# Patient Record
Sex: MALE | Race: BLACK OR AFRICAN AMERICAN | Employment: PART TIME | URBAN - METROPOLITAN AREA
[De-identification: names, ages, dates, MRNs, and addresses within clinical notes are randomized per-mention and may not be internally consistent; named-entity substitution may affect disease eponyms.]

---

## 2017-02-10 PROBLEM — T45.1X5A CHEMOTHERAPY INDUCED NEUTROPENIA (HCC): Status: ACTIVE | Noted: 2017-02-10

## 2017-02-10 PROBLEM — D70.1 CHEMOTHERAPY INDUCED NEUTROPENIA (HCC): Status: ACTIVE | Noted: 2017-02-10

## 2017-05-30 ENCOUNTER — HOSPITAL ENCOUNTER (OUTPATIENT)
Dept: ONCOLOGY | Age: 57
Discharge: HOME | End: 2017-05-31
Attending: INTERNAL MEDICINE | Admitting: INTERNAL MEDICINE

## 2017-05-30 ENCOUNTER — HOSPITAL ENCOUNTER (OUTPATIENT)
Dept: PULMONOLOGY | Age: 57
Discharge: HOME | End: 2017-05-30
Attending: INTERNAL MEDICINE | Admitting: INTERNAL MEDICINE

## 2017-05-30 VITALS
RESPIRATION RATE: 18 BRPM | BODY MASS INDEX: 22.45 KG/M2 | WEIGHT: 148.15 LBS | TEMPERATURE: 98.3 F | HEIGHT: 68 IN | SYSTOLIC BLOOD PRESSURE: 143 MMHG | DIASTOLIC BLOOD PRESSURE: 84 MMHG | HEART RATE: 82 BPM

## 2017-05-30 DIAGNOSIS — C90.30 SOLITARY PLASMACYTOMA NOT HAVING ACHIEVED REMISSION (HCC): ICD-10-CM

## 2017-05-30 LAB
A/G RATIO: 2 (ref 1.1–2.2)
ABO/RH: NORMAL
ALBUMIN SERPL-MCNC: 4 G/DL (ref 3.4–5)
ALP BLD-CCNC: 106 U/L (ref 40–129)
ALT SERPL-CCNC: 14 U/L (ref 10–40)
ANION GAP SERPL CALCULATED.3IONS-SCNC: 14 MMOL/L (ref 3–16)
ANTIBODY SCREEN: NORMAL
APTT: 42.1 SEC (ref 21–31.8)
AST SERPL-CCNC: 20 U/L (ref 15–37)
BASOPHILS ABSOLUTE: 0 K/UL (ref 0–0.2)
BASOPHILS RELATIVE PERCENT: 0.7 %
BILIRUB SERPL-MCNC: <0.2 MG/DL (ref 0–1)
BILIRUBIN DIRECT: <0.2 MG/DL (ref 0–0.3)
BILIRUBIN, INDIRECT: NORMAL MG/DL (ref 0–1)
BUN BLDV-MCNC: 28 MG/DL (ref 7–20)
CALCIUM SERPL-MCNC: 9.2 MG/DL (ref 8.3–10.6)
CHLORIDE BLD-SCNC: 96 MMOL/L (ref 99–110)
CO2: 33 MMOL/L (ref 21–32)
CREAT SERPL-MCNC: 6.9 MG/DL (ref 0.9–1.3)
EKG ATRIAL RATE: 78 BPM
EKG DIAGNOSIS: NORMAL
EKG P AXIS: 44 DEGREES
EKG P-R INTERVAL: 142 MS
EKG Q-T INTERVAL: 430 MS
EKG QRS DURATION: 84 MS
EKG QTC CALCULATION (BAZETT): 490 MS
EKG R AXIS: 26 DEGREES
EKG T AXIS: 189 DEGREES
EKG VENTRICULAR RATE: 78 BPM
EOSINOPHILS ABSOLUTE: 0 K/UL (ref 0–0.6)
EOSINOPHILS RELATIVE PERCENT: 0.6 %
GFR AFRICAN AMERICAN: 10
GFR NON-AFRICAN AMERICAN: 8
GLOBULIN: 2 G/DL
GLUCOSE BLD-MCNC: 102 MG/DL (ref 70–99)
HAV IGM SER IA-ACNC: NORMAL
HBV SURFACE AB TITR SER: <3.5 MUL/ML
HCT VFR BLD CALC: 29.3 % (ref 40.5–52.5)
HEMOGLOBIN: 9.4 G/DL (ref 13.5–17.5)
HEPATITIS B SURFACE ANTIGEN INTERPRETATION: NORMAL
HEPATITIS C ANTIBODY INTERPRETATION: NORMAL
INR BLD: 1 (ref 0.85–1.15)
LACTATE DEHYDROGENASE: 241 U/L (ref 100–190)
LV EF: 50 %
LVEF MODALITY: NORMAL
LYMPHOCYTES ABSOLUTE: 0.4 K/UL (ref 1–5.1)
LYMPHOCYTES RELATIVE PERCENT: 10.5 %
MCH RBC QN AUTO: 29 PG (ref 26–34)
MCHC RBC AUTO-ENTMCNC: 32.1 G/DL (ref 31–36)
MCV RBC AUTO: 90.3 FL (ref 80–100)
MONOCYTES ABSOLUTE: 0.8 K/UL (ref 0–1.3)
MONOCYTES RELATIVE PERCENT: 18.2 %
NEUTROPHILS ABSOLUTE: 3 K/UL (ref 1.7–7.7)
NEUTROPHILS RELATIVE PERCENT: 70 %
PDW BLD-RTO: 19.5 % (ref 12.4–15.4)
PHOSPHORUS: 4.6 MG/DL (ref 2.5–4.9)
PLATELET # BLD: 115 K/UL (ref 135–450)
PMV BLD AUTO: 8.8 FL (ref 5–10.5)
POTASSIUM SERPL-SCNC: 3.7 MMOL/L (ref 3.5–5.1)
PROTHROMBIN TIME: 11.3 SEC (ref 9.6–13)
RBC # BLD: 3.24 M/UL (ref 4.2–5.9)
SODIUM BLD-SCNC: 143 MMOL/L (ref 136–145)
TOTAL PROTEIN: 6 G/DL (ref 6.4–8.2)
URIC ACID, SERUM: 3.2 MG/DL (ref 3.5–7.2)
WBC # BLD: 4.2 K/UL (ref 4–11)

## 2017-05-30 PROCEDURE — 93010 ELECTROCARDIOGRAM REPORT: CPT | Performed by: INTERNAL MEDICINE

## 2017-05-30 RX ORDER — HEPARIN SODIUM (PORCINE) LOCK FLUSH IV SOLN 100 UNIT/ML 100 UNIT/ML
100 SOLUTION INTRAVENOUS PRN
Status: DISCONTINUED | OUTPATIENT
Start: 2017-05-30 | End: 2017-06-01 | Stop reason: HOSPADM

## 2017-05-31 LAB — RPR: NORMAL

## 2017-06-01 LAB
CYTOMEGALOVIRUS IGG ANTIBODY: >10 U/ML
CYTOMEGALOVIRUS IGM ANTIBODY: <8 AU/ML
HEPATITIS B CORE TOTAL ANTIBODY: NEGATIVE
HEPATITIS BE ANTIBODY: NEGATIVE
HERPES TYPE 1/2 IGM COMBINED: 0.13 IV
VZV IGG SER QL IA: 193 IV
WEST NILE VIRUS, IGG: 0.01 IV
WEST NILE VIRUS, IGM: 0.03 IV

## 2017-06-02 LAB
EER HCV RNA QNT PCR: NORMAL
HCV RNA QNT REAL-TIME PCR INTERP: NOT DETECTED
HCV RNA, QUANTITATIVE REAL TIME PCR: <1.2 LOG IU
HEPATITIS C RNA PCR QUANT: <15 IU/ML
HERPES TYPE I/II IGG COMBINED: >22.4 IV
HIV-1 AND HIV-2 ANTIBODIES: NEGATIVE
HIV-1 DNA, QUAL, PCR: NOT DETECTED
HTLV I/II AB: NEGATIVE
VARICELLA ZOSTER AB IGM: 0 ISR

## 2017-06-03 LAB — TRYPANOSOMA CRUZI IGG ANTIBODY: 0.28 IV

## 2017-06-04 LAB — TRYPANOSOMA CRUZI IGM ANTIBODY: NORMAL

## 2017-06-05 ENCOUNTER — TELEPHONE (OUTPATIENT)
Dept: PULMONOLOGY | Age: 57
End: 2017-06-05

## 2017-06-06 LAB — SICKLE CELL SCREEN: NEGATIVE

## 2017-06-09 ENCOUNTER — OFFICE VISIT (OUTPATIENT)
Dept: CARDIOLOGY CLINIC | Age: 57
End: 2017-06-09

## 2017-06-09 VITALS
DIASTOLIC BLOOD PRESSURE: 90 MMHG | BODY MASS INDEX: 23.04 KG/M2 | SYSTOLIC BLOOD PRESSURE: 150 MMHG | HEART RATE: 68 BPM | WEIGHT: 152 LBS

## 2017-06-09 DIAGNOSIS — R94.31 ABNORMAL EKG: ICD-10-CM

## 2017-06-09 DIAGNOSIS — I34.0 NON-RHEUMATIC MITRAL REGURGITATION: ICD-10-CM

## 2017-06-09 DIAGNOSIS — Z01.818 PRE-OP EVALUATION: Primary | ICD-10-CM

## 2017-06-09 DIAGNOSIS — I10 ESSENTIAL HYPERTENSION: ICD-10-CM

## 2017-06-09 PROCEDURE — 1111F DSCHRG MED/CURRENT MED MERGE: CPT | Performed by: INTERNAL MEDICINE

## 2017-06-09 PROCEDURE — G8420 CALC BMI NORM PARAMETERS: HCPCS | Performed by: INTERNAL MEDICINE

## 2017-06-09 PROCEDURE — 3017F COLORECTAL CA SCREEN DOC REV: CPT | Performed by: INTERNAL MEDICINE

## 2017-06-09 PROCEDURE — G8428 CUR MEDS NOT DOCUMENT: HCPCS | Performed by: INTERNAL MEDICINE

## 2017-06-09 PROCEDURE — 1036F TOBACCO NON-USER: CPT | Performed by: INTERNAL MEDICINE

## 2017-06-09 PROCEDURE — 99204 OFFICE O/P NEW MOD 45 MIN: CPT | Performed by: INTERNAL MEDICINE

## 2017-06-09 ASSESSMENT — ENCOUNTER SYMPTOMS
CHEST TIGHTNESS: 0
SHORTNESS OF BREATH: 0
APNEA: 0
COUGH: 0
ABDOMINAL DISTENTION: 0
CHOKING: 0

## 2017-06-14 ENCOUNTER — TELEPHONE (OUTPATIENT)
Dept: PULMONOLOGY | Age: 57
End: 2017-06-14

## 2017-06-14 ENCOUNTER — OFFICE VISIT (OUTPATIENT)
Dept: PULMONOLOGY | Age: 57
End: 2017-06-14

## 2017-06-14 VITALS
SYSTOLIC BLOOD PRESSURE: 140 MMHG | HEART RATE: 81 BPM | DIASTOLIC BLOOD PRESSURE: 86 MMHG | HEIGHT: 68 IN | BODY MASS INDEX: 22.88 KG/M2 | OXYGEN SATURATION: 97 % | WEIGHT: 151 LBS | RESPIRATION RATE: 16 BRPM

## 2017-06-14 DIAGNOSIS — J98.4 RESTRICTIVE LUNG DISEASE: Primary | ICD-10-CM

## 2017-06-14 PROCEDURE — G8420 CALC BMI NORM PARAMETERS: HCPCS | Performed by: INTERNAL MEDICINE

## 2017-06-14 PROCEDURE — 1036F TOBACCO NON-USER: CPT | Performed by: INTERNAL MEDICINE

## 2017-06-14 PROCEDURE — 3017F COLORECTAL CA SCREEN DOC REV: CPT | Performed by: INTERNAL MEDICINE

## 2017-06-14 PROCEDURE — G8427 DOCREV CUR MEDS BY ELIG CLIN: HCPCS | Performed by: INTERNAL MEDICINE

## 2017-06-14 PROCEDURE — 99214 OFFICE O/P EST MOD 30 MIN: CPT | Performed by: INTERNAL MEDICINE

## 2017-06-29 PROBLEM — Z94.81 AUTOLOGOUS BONE MARROW TRANSPLANTATION STATUS (HCC): Status: ACTIVE | Noted: 2017-06-29

## 2018-04-19 PROBLEM — J18.9 PNEUMONIA: Status: ACTIVE | Noted: 2018-04-19

## 2019-03-14 ENCOUNTER — HOSPITAL ENCOUNTER (OUTPATIENT)
Dept: ONCOLOGY | Age: 59
Setting detail: INFUSION SERIES
Discharge: HOME | End: 2019-03-14
Payer: MEDICARE

## 2019-03-14 ENCOUNTER — HOSPITAL ENCOUNTER (OUTPATIENT)
Dept: GENERAL RADIOLOGY | Age: 59
Discharge: HOME | End: 2019-03-14
Payer: MEDICARE

## 2019-03-14 VITALS
SYSTOLIC BLOOD PRESSURE: 139 MMHG | DIASTOLIC BLOOD PRESSURE: 69 MMHG | OXYGEN SATURATION: 99 % | HEART RATE: 81 BPM | RESPIRATION RATE: 18 BRPM | TEMPERATURE: 97.9 F

## 2019-03-14 PROBLEM — J11.1 INFLUENZA: Status: ACTIVE | Noted: 2019-03-14

## 2019-03-14 LAB
A/G RATIO: 1.4 (ref 1.1–2.2)
ALBUMIN SERPL-MCNC: 4.3 G/DL (ref 3.4–5)
ALP BLD-CCNC: 60 U/L (ref 40–129)
ALT SERPL-CCNC: 11 U/L (ref 10–40)
ANION GAP SERPL CALCULATED.3IONS-SCNC: 14 MMOL/L (ref 3–16)
ANISOCYTOSIS: ABNORMAL
AST SERPL-CCNC: 19 U/L (ref 15–37)
BASOPHILS ABSOLUTE: 0 K/UL (ref 0–0.2)
BASOPHILS RELATIVE PERCENT: 0.4 %
BILIRUB SERPL-MCNC: <0.2 MG/DL (ref 0–1)
BUN BLDV-MCNC: 21 MG/DL (ref 7–20)
CALCIUM SERPL-MCNC: 9.2 MG/DL (ref 8.3–10.6)
CHLORIDE BLD-SCNC: 97 MMOL/L (ref 99–110)
CO2: 30 MMOL/L (ref 21–32)
CREAT SERPL-MCNC: 7.1 MG/DL (ref 0.9–1.3)
EOSINOPHILS ABSOLUTE: 0 K/UL (ref 0–0.6)
EOSINOPHILS RELATIVE PERCENT: 0 %
GFR AFRICAN AMERICAN: 10
GFR NON-AFRICAN AMERICAN: 8
GLOBULIN: 3.1 G/DL
GLUCOSE BLD-MCNC: 129 MG/DL (ref 70–99)
HCT VFR BLD CALC: 38.2 % (ref 40.5–52.5)
HEMOGLOBIN: 12.3 G/DL (ref 13.5–17.5)
LYMPHOCYTES ABSOLUTE: 0.3 K/UL (ref 1–5.1)
LYMPHOCYTES RELATIVE PERCENT: 9.8 %
MCH RBC QN AUTO: 29 PG (ref 26–34)
MCHC RBC AUTO-ENTMCNC: 32.1 G/DL (ref 31–36)
MCV RBC AUTO: 90.4 FL (ref 80–100)
MONOCYTES ABSOLUTE: 0.5 K/UL (ref 0–1.3)
MONOCYTES RELATIVE PERCENT: 17.3 %
NEUTROPHILS ABSOLUTE: 2.1 K/UL (ref 1.7–7.7)
NEUTROPHILS RELATIVE PERCENT: 72.5 %
PDW BLD-RTO: 17.4 % (ref 12.4–15.4)
PLATELET # BLD: 95 K/UL (ref 135–450)
PLATELET SLIDE REVIEW: ABNORMAL
PMV BLD AUTO: 7.9 FL (ref 5–10.5)
POTASSIUM SERPL-SCNC: 5 MMOL/L (ref 3.5–5.1)
RAPID INFLUENZA  B AGN: NEGATIVE
RAPID INFLUENZA A AGN: POSITIVE
RBC # BLD: 4.23 M/UL (ref 4.2–5.9)
SODIUM BLD-SCNC: 141 MMOL/L (ref 136–145)
TOTAL PROTEIN: 7.4 G/DL (ref 6.4–8.2)
WBC # BLD: 2.9 K/UL (ref 4–11)

## 2019-03-14 PROCEDURE — 99213 OFFICE O/P EST LOW 20 MIN: CPT

## 2019-03-14 PROCEDURE — 87804 INFLUENZA ASSAY W/OPTIC: CPT

## 2019-03-14 PROCEDURE — 89220 SPUTUM SPECIMEN COLLECTION: CPT

## 2019-03-14 PROCEDURE — 71045 X-RAY EXAM CHEST 1 VIEW: CPT

## 2019-03-14 PROCEDURE — 85025 COMPLETE CBC W/AUTO DIFF WBC: CPT

## 2019-03-14 PROCEDURE — 80053 COMPREHEN METABOLIC PANEL: CPT

## 2019-03-14 PROCEDURE — 6370000000 HC RX 637 (ALT 250 FOR IP): Performed by: INTERNAL MEDICINE

## 2019-03-14 RX ORDER — ACETAMINOPHEN 500 MG
500 TABLET ORAL PRN
Status: DISCONTINUED | OUTPATIENT
Start: 2019-03-14 | End: 2019-03-15 | Stop reason: HOSPADM

## 2019-03-14 RX ADMIN — ACETAMINOPHEN 500 MG: 500 TABLET, COATED ORAL at 14:00

## 2019-03-14 ASSESSMENT — PAIN SCALES - GENERAL: PAINLEVEL_OUTOF10: 0

## 2019-03-14 NOTE — PROGRESS NOTES
03/14/19    ONCOLOGY FOLLOW-UP:       Primary Oncologist: Geovanni Mistry    Referring Physician: Lincoln Mchugh DO       PROBLEM LIST:       1. Kappa light chain multiple myeloma, Dx early April 2014 - Calcium 12.2, creatinine 5.91, albumin 4.7. Beta-2 microglobulin 21.5. Hemoglobin 7.6. Kappa free light chains 2970. Multiple osteolytic lesions and also a soft tissue mass in the SC joint, 5.9 x 4.3 cm. 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 Bone marrow aspiration and biopsy hypercellular for age, 75% to 80%, nearly exclusively CD38,  plasma cells cytoplasmically restricted to kappa light chain. Conventional karyotyping normal. FISH positive for t(11;14). 2. ESRD - Tuesdays, Thursdays, and Saturdays. 3. Catheter-related infection, culture negative      TREATMENT:      1. Induction therapy: bortezomib and dexamethasone due to renal failure. Hypercalcemia addressed with aggressive hydration and bisphosphonate; initial improvement in renal function with creatinine down to 3.8. 2. RVd, lenalidomide dose attenuated to 5 mg daily 14-out-of-28 days due to persistently elevated kappa serum light chains. 3. PBSC collections   4. XRT to anterior chest  5. Carfilzomib / Dex  6. DCEP  Cycle #1 - 2/6/17  Cycle #2 - 3/13/17  Cycle #3 - 5/8/17    7. [id number] & ASCT (6/29/17)      INTERVAL HISTORY:     Areli Jaimes returns for an acute visit. He reports yesterday he developed fevers. He has a nonproductive cough but denies SOB with rest or exertion. He denies GI changes. He is sleepy today but reports that this is his norm after dialysis. He is eating and drinking without issue. ROS:      CONSTITUTIONAL: Denies fever, sweats, weight loss. EYES: No visual changes or diplopia. No scleral icterus. ENT: No Headaches, hearing loss or vertigo. No mouth sores or sore throat.   CARDIOVASCULAR: No chest pain, dyspnea on exertion, palpitations or loss of consciousness. RESPIRATORY: No cough or wheezing, no sputum production. No hemoptysis. Patagonia Hilt GASTROINTESTINAL: No abdominal pain, appetite loss, blood in stools. No change in bowel habits. GENITOURINARY: No dysuria, trouble voiding, or hematuria. MUSCULOSKELETAL:  No weakness. No joint complaints. INTEGUMENTARY: No rash or pruritis. NEUROLOGICAL: No headache, diplopia. No change in gait, balance, or coordination. No paresthesias. ENDOCRINE: No temperature intolerance. No excessive thirst, fluid intake, or urination. HEMATOLOGIC/LYMPHATIC: No abnormal bruising or ecchymoses, blood clots or swollen lymph nodes. ALLERGIC/IMMUNOLOGIC: No nasal congestion or hives. PHYSICAL EXAM:       /69   Pulse 81   Temp 97.9 °F (36.6 °C)   Resp 18   SpO2 99%   Wt Readings from Last 3 Encounters:   04/23/18 132 lb 3.2 oz (60 kg)   08/22/17 138 lb (62.6 kg)   08/17/17 142 lb 6.4 oz (64.6 kg)       GENERAL APPERANCE: alert and cooperative  HEAD: Normocephalic, without obvious abnormality, atraumatic  NECK: No palpable lymphadenopathy in supraclavicular, cervical, axillary or inguinal regions.    LUNGS: Clear to auscultation and percussion bilaterally, no audible rales, wheezes or crackles  HEART: Regular rate and rhythm, S1, S2 normal  ABDOMEN: Soft, non-tender; bowel sounds normal; no masses,  no organomegaly  EXTREMITIES: without cyanosis, clubbing, edema or asymmetry  NEURO: non-focal.  SKIN: No jaundice, purpura or petechiae    LABS:     CBC:  Recent Labs     03/14/19  1142   WBC 2.9*   LYMPHOPCT 9.8   RBC 4.23   HGB 12.3*   HCT 38.2*   MCV 90.4   MCH 29.0   MCHC 32.1   RDW 17.4*   PLT 95*      CMP:  Recent Labs     03/14/19  1247   LABALBU 4.3   ALT 11   AST 19   BILITOT <0.2   BUN 21*   CALCIUM 9.2   CL 97*   CREATININE 7.1*   LABGLOM 8*   CO2 30   GLUCOSE 129*   K 5.0   PROT 7.4   AGRATIO 1.4      ANIONGAP 14       Coags: No results for input(s): PROTIME, INR, APTT in the last 72 hours.    TUMOR MARKERS: No results found for: PSA, CEA, , [id number],       MEDICATIONS:     No outpatient medications have been marked as taking for the 3/14/19 encounter [facility] Encounter) with LAB ROOM 3 [facility] OP ONC. IMAGING:     Xr Chest Portable 3/14/19  1. Underlying myelomatous lesions of the ribs and that appendicular and axial skeletal structures with mottling noted diffusely, unchanged from 2017.   2. There is evidence of underlying chronic lung disease with chest radiographic bronchovascular/reticular densities noted on the prior study of 2017.   3. When compared to the immediate prior exam, there is been radiographic improvement with a decrease in the degree of pulmonary opacity in the right and left lung base with improved visualization of the diaphragm on the current exam.         ASSESSMENT AND PLAN:     1. Kappa Light Chain Multiple Myeloma: He currently has progressive disease, but light chains can be challenging to interpret in the setting of HD  - s/p [id number] & Autologous transplant (6/29/17)  - Pt reluctant to start Revlimid due to pruritus   - SFLC rising, will need to follow closely after he recovers from influenza    - skeletal survey once recovered from influenza; if not helpful, then pan CT (without contrast)      2. Lytic Lesions: Stable  - Pamidronate (60 mg) every 3 months    3. ID: Influenza A+ 3/14  - Vaccines: #1: 1/4/18, #2: 3/15/18, #3: 6/14/18   - MMR in June 2019  - Influenza A+ 3/14/19. Start Tamiflu 30mg today and then take 30mg after every HD cycle; not to exceed 5 days. - CXR 3/6/19: radiographic improvement with a decrease in the degree of pulmonary opacity in the right and left lung base with improved visualization of the diaphragm on the current exam.  - He will also be started on Levaquin 250 mg every other day. - He was instructed to call with worsening symptoms. 4. Heme: chemotx induced pancytopenia  - Transfuse for Hgb < 7 and Platelets < 49D    5. ESRD / Metabolic:   - HD T/Th/Sat  - Continue Sevelamer 2400 mg PO TID  - Continue cinacalcet 30 mg PO daily    6. Chronic Pain:   - Improved and no longer needing Oxycontin  - Cont Oxycodone 10 mg q4hrs as needed (Rx 11/29/18, #100)    7. HTN:   - Managed by nephrology  - Cont Norvasc 10 mg daily    No follow-ups on file.     Yoshi Lerner

## 2019-03-14 NOTE — PLAN OF CARE
Problem: KNOWLEDGE DEFICIT  Goal: Patient/S.O. demonstrates understanding of disease process, treatment plan, medications, and discharge instructions. Outcome: Met This Shift  Note:   Patient seen in OPO for Rapid Flu r/t s/s of cough and fever Test completed by RT. Results positive and forwarded to Laurel CAMARGO, labs drawn and portable chest xray obtained, MD made aware of results. Tylenol 500 mg given po as ordered for temp of Renee CAMARGO here to explain to patient medications being ordered. Patient verbalized understanding of d/c instructions. Patient discharged after drinking fluids, temp at discharge 97.9, pt states he is feeling better.

## 2019-08-19 ENCOUNTER — HOSPITAL ENCOUNTER (OUTPATIENT)
Dept: CT IMAGING | Age: 59
Discharge: HOME | End: 2019-08-19
Payer: MEDICARE

## 2019-08-19 VITALS
TEMPERATURE: 97.5 F | RESPIRATION RATE: 16 BRPM | DIASTOLIC BLOOD PRESSURE: 85 MMHG | BODY MASS INDEX: 18.97 KG/M2 | HEIGHT: 70 IN | SYSTOLIC BLOOD PRESSURE: 145 MMHG | OXYGEN SATURATION: 95 % | HEART RATE: 62 BPM

## 2019-08-19 DIAGNOSIS — C90.00 MULTIPLE MYELOMA, REMISSION STATUS UNSPECIFIED (HCC): ICD-10-CM

## 2019-08-19 LAB
APTT: 41 SEC (ref 26–36)
BASOPHILS ABSOLUTE: 0 K/UL (ref 0–0.2)
BASOPHILS RELATIVE PERCENT: 0.7 %
EOSINOPHILS ABSOLUTE: 0.1 K/UL (ref 0–0.6)
EOSINOPHILS RELATIVE PERCENT: 3.4 %
HCT VFR BLD CALC: 39.7 % (ref 40.5–52.5)
HEMOGLOBIN: 12.3 G/DL (ref 13.5–17.5)
INR BLD: 1.02 (ref 0.86–1.14)
LYMPHOCYTES ABSOLUTE: 1.6 K/UL (ref 1–5.1)
LYMPHOCYTES RELATIVE PERCENT: 41 %
MCH RBC QN AUTO: 30.2 PG (ref 26–34)
MCHC RBC AUTO-ENTMCNC: 31.1 G/DL (ref 31–36)
MCV RBC AUTO: 97.2 FL (ref 80–100)
MONOCYTES ABSOLUTE: 0.8 K/UL (ref 0–1.3)
MONOCYTES RELATIVE PERCENT: 21.6 %
NEUTROPHILS ABSOLUTE: 1.3 K/UL (ref 1.7–7.7)
NEUTROPHILS RELATIVE PERCENT: 33.3 %
PDW BLD-RTO: 18.5 % (ref 12.4–15.4)
PLATELET # BLD: 109 K/UL (ref 135–450)
PMV BLD AUTO: 8.5 FL (ref 5–10.5)
PROTHROMBIN TIME: 11.6 SEC (ref 9.8–13)
RBC # BLD: 4.08 M/UL (ref 4.2–5.9)
WBC # BLD: 3.9 K/UL (ref 4–11)

## 2019-08-19 PROCEDURE — 88305 TISSUE EXAM BY PATHOLOGIST: CPT

## 2019-08-19 PROCEDURE — 7100000010 HC PHASE II RECOVERY - FIRST 15 MIN

## 2019-08-19 PROCEDURE — 2709999900 CT BIOPSY BONE MARROW

## 2019-08-19 PROCEDURE — 88275 CYTOGENETICS 100-300: CPT

## 2019-08-19 PROCEDURE — 7100000011 HC PHASE II RECOVERY - ADDTL 15 MIN

## 2019-08-19 PROCEDURE — 2500000003 HC RX 250 WO HCPCS: Performed by: RADIOLOGY

## 2019-08-19 PROCEDURE — 88264 CHROMOSOME ANALYSIS 20-25: CPT

## 2019-08-19 PROCEDURE — 88271 CYTOGENETICS DNA PROBE: CPT

## 2019-08-19 PROCEDURE — 88237 TISSUE CULTURE BONE MARROW: CPT

## 2019-08-19 PROCEDURE — 88184 FLOWCYTOMETRY/ TC 1 MARKER: CPT

## 2019-08-19 PROCEDURE — 6360000002 HC RX W HCPCS: Performed by: RADIOLOGY

## 2019-08-19 PROCEDURE — 36415 COLL VENOUS BLD VENIPUNCTURE: CPT

## 2019-08-19 PROCEDURE — 88341 IMHCHEM/IMCYTCHM EA ADD ANTB: CPT

## 2019-08-19 PROCEDURE — 88311 DECALCIFY TISSUE: CPT

## 2019-08-19 PROCEDURE — 85730 THROMBOPLASTIN TIME PARTIAL: CPT

## 2019-08-19 PROCEDURE — 88313 SPECIAL STAINS GROUP 2: CPT

## 2019-08-19 PROCEDURE — 85610 PROTHROMBIN TIME: CPT

## 2019-08-19 PROCEDURE — 85025 COMPLETE CBC W/AUTO DIFF WBC: CPT

## 2019-08-19 PROCEDURE — 88342 IMHCHEM/IMCYTCHM 1ST ANTB: CPT

## 2019-08-19 PROCEDURE — 88185 FLOWCYTOMETRY/TC ADD-ON: CPT

## 2019-08-19 RX ORDER — MIDAZOLAM HYDROCHLORIDE 1 MG/ML
INJECTION INTRAMUSCULAR; INTRAVENOUS
Status: COMPLETED | OUTPATIENT
Start: 2019-08-19 | End: 2019-08-19

## 2019-08-19 RX ORDER — FENTANYL CITRATE 50 UG/ML
INJECTION, SOLUTION INTRAMUSCULAR; INTRAVENOUS
Status: COMPLETED | OUTPATIENT
Start: 2019-08-19 | End: 2019-08-19

## 2019-08-19 RX ORDER — LIDOCAINE HYDROCHLORIDE 20 MG/ML
INJECTION, SOLUTION EPIDURAL; INFILTRATION; INTRACAUDAL; PERINEURAL
Status: COMPLETED | OUTPATIENT
Start: 2019-08-19 | End: 2019-08-19

## 2019-08-19 RX ADMIN — FENTANYL CITRATE 25 MCG: 50 INJECTION, SOLUTION INTRAMUSCULAR; INTRAVENOUS at 12:31

## 2019-08-19 RX ADMIN — MIDAZOLAM HYDROCHLORIDE 0.5 MG: 2 INJECTION, SOLUTION INTRAMUSCULAR; INTRAVENOUS at 12:38

## 2019-08-19 RX ADMIN — FENTANYL CITRATE 25 MCG: 50 INJECTION, SOLUTION INTRAMUSCULAR; INTRAVENOUS at 12:38

## 2019-08-19 RX ADMIN — LIDOCAINE HYDROCHLORIDE 5 ML: 20 INJECTION, SOLUTION EPIDURAL; INFILTRATION; INTRACAUDAL; PERINEURAL at 12:39

## 2019-08-19 RX ADMIN — MIDAZOLAM HYDROCHLORIDE 0.5 MG: 2 INJECTION, SOLUTION INTRAMUSCULAR; INTRAVENOUS at 12:31

## 2019-08-19 ASSESSMENT — PAIN SCALES - GENERAL
PAINLEVEL_OUTOF10: 0

## 2019-08-19 NOTE — PROGRESS NOTES
Patient arrived to [facility] 6 following R Ileum bone marrow biopsy. Patient is alert and oriented x4, respirations easy and non-labored, patient denies any pain or nausea. Gauze and tegaderm dressing to right lower back/buttocks clean dry and intact, no drainage or bleeding noted, no swelling or redness to surrounding site. Patient resting on stretcher, provided box lunch and informed that patient may not be discharged home until after 1500. Patient verbalized understanding, will continue to monitor site and frequent VS, call light in reach.

## 2019-08-19 NOTE — PROGRESS NOTES
Ambulatory Surgery/Procedure Discharge Note    Vitals:    08/19/19 1623   BP: (!) 145/85   Pulse: 62   Resp: 16   Temp: 97.5 °F (36.4 °C)   SpO2: 95%       In: 240 [P.O.:240]  Out: -     Restroom use offered before discharge. Yes    Pain assessment:  none  Pain Level: 0    Patient's ride arrived. Education completed with patient and family member, verbalized understanding. Dressing remains clean/dry/intact, no drainage or bleeding. Patient denies pain, offered restroom prior to discharge but denied need for use. Patient discharged to home/self care. Patient discharged via wheel chair by transporter to waiting family.        8/19/2019 6:23 PM

## 2019-09-13 ENCOUNTER — HOSPITAL ENCOUNTER (OUTPATIENT)
Dept: CT IMAGING | Age: 59
Discharge: HOME | End: 2019-09-13
Payer: MEDICARE

## 2019-09-13 DIAGNOSIS — C90.00 KAPPA LIGHT CHAIN MYELOMA (HCC): ICD-10-CM

## 2019-09-13 PROCEDURE — 70490 CT SOFT TISSUE NECK W/O DYE: CPT

## 2019-09-13 PROCEDURE — 74176 CT ABD & PELVIS W/O CONTRAST: CPT

## 2020-11-03 PROBLEM — J18.9 PNEUMONIA OF BOTH LOWER LOBES DUE TO INFECTIOUS ORGANISM: Status: RESOLVED | Noted: 2020-11-03 | Resolved: 2020-11-03
